# Patient Record
Sex: FEMALE | Race: AMERICAN INDIAN OR ALASKA NATIVE
[De-identification: names, ages, dates, MRNs, and addresses within clinical notes are randomized per-mention and may not be internally consistent; named-entity substitution may affect disease eponyms.]

---

## 2020-03-03 ENCOUNTER — HOSPITAL ENCOUNTER (EMERGENCY)
Dept: HOSPITAL 5 - ED | Age: 55
Discharge: HOME | End: 2020-03-03
Payer: SELF-PAY

## 2020-03-03 VITALS — DIASTOLIC BLOOD PRESSURE: 58 MMHG | SYSTOLIC BLOOD PRESSURE: 113 MMHG

## 2020-03-03 DIAGNOSIS — E11.9: ICD-10-CM

## 2020-03-03 DIAGNOSIS — J06.9: Primary | ICD-10-CM

## 2020-03-03 DIAGNOSIS — Z87.891: ICD-10-CM

## 2020-03-03 DIAGNOSIS — Z79.4: ICD-10-CM

## 2020-03-03 DIAGNOSIS — E78.5: ICD-10-CM

## 2020-03-03 DIAGNOSIS — Z98.890: ICD-10-CM

## 2020-03-03 DIAGNOSIS — J40: ICD-10-CM

## 2020-03-03 DIAGNOSIS — Z79.899: ICD-10-CM

## 2020-03-03 DIAGNOSIS — E03.9: ICD-10-CM

## 2020-03-03 DIAGNOSIS — I10: ICD-10-CM

## 2020-03-03 DIAGNOSIS — Z88.2: ICD-10-CM

## 2020-03-03 LAB
ALBUMIN SERPL-MCNC: 3.8 G/DL (ref 3.9–5)
ALT SERPL-CCNC: 12 UNITS/L (ref 7–56)
BAND NEUTROPHILS # (MANUAL): 0 K/MM3
BUN SERPL-MCNC: 16 MG/DL (ref 7–17)
BUN/CREAT SERPL: 11 %
CALCIUM SERPL-MCNC: 8.8 MG/DL (ref 8.4–10.2)
HCT VFR BLD CALC: 39.8 % (ref 30.3–42.9)
HCT VFR BLD CALC: 41.4 % (ref 30.3–42.9)
HEMOLYSIS INDEX: 5
HGB BLD-MCNC: 13.3 GM/DL (ref 10.1–14.3)
HGB BLD-MCNC: 13.6 GM/DL (ref 10.1–14.3)
MCHC RBC AUTO-ENTMCNC: 33 % (ref 30–34)
MCHC RBC AUTO-ENTMCNC: 33 % (ref 30–34)
MCV RBC AUTO: 86 FL (ref 79–97)
MCV RBC AUTO: 87 FL (ref 79–97)
MYELOCYTES # (MANUAL): 0 K/MM3
PLATELET # BLD: 212 K/MM3 (ref 140–440)
PLATELET # BLD: 223 K/MM3 (ref 140–440)
PROMYELOCYTES # (MANUAL): 0 K/MM3
RBC # BLD AUTO: 4.61 M/MM3 (ref 3.65–5.03)
RBC # BLD AUTO: 4.77 M/MM3 (ref 3.65–5.03)
TOTAL CELLS COUNTED BLD: 100
WBC TOXIC VACUOLES BLD QL SMEAR: (no result)

## 2020-03-03 PROCEDURE — 85379 FIBRIN DEGRADATION QUANT: CPT

## 2020-03-03 PROCEDURE — 82140 ASSAY OF AMMONIA: CPT

## 2020-03-03 PROCEDURE — 84484 ASSAY OF TROPONIN QUANT: CPT

## 2020-03-03 PROCEDURE — 71275 CT ANGIOGRAPHY CHEST: CPT

## 2020-03-03 PROCEDURE — 87040 BLOOD CULTURE FOR BACTERIA: CPT

## 2020-03-03 PROCEDURE — 93010 ELECTROCARDIOGRAM REPORT: CPT

## 2020-03-03 PROCEDURE — 36415 COLL VENOUS BLD VENIPUNCTURE: CPT

## 2020-03-03 PROCEDURE — 80053 COMPREHEN METABOLIC PANEL: CPT

## 2020-03-03 PROCEDURE — 94640 AIRWAY INHALATION TREATMENT: CPT

## 2020-03-03 PROCEDURE — 85025 COMPLETE CBC W/AUTO DIFF WBC: CPT

## 2020-03-03 PROCEDURE — 71046 X-RAY EXAM CHEST 2 VIEWS: CPT

## 2020-03-03 PROCEDURE — 94644 CONT INHLJ TX 1ST HOUR: CPT

## 2020-03-03 PROCEDURE — 99284 EMERGENCY DEPT VISIT MOD MDM: CPT

## 2020-03-03 PROCEDURE — 93005 ELECTROCARDIOGRAM TRACING: CPT

## 2020-03-03 PROCEDURE — 85007 BL SMEAR W/DIFF WBC COUNT: CPT

## 2020-03-03 NOTE — EVENT NOTE
Date: 03/03/20





1100 Sat 94 on room air


denies cp or sob





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





                                   Lab Results











  03/03/20 03/03/20 03/03/20 Range/Units





  10:23 10:23 10:23 


 


WBC  6.2    (4.5-11.0)  K/mm3


 


RBC  4.77    (3.65-5.03)  M/mm3


 


Hgb  13.6    (10.1-14.3)  gm/dl


 


Hct  41.4    (30.3-42.9)  %


 


MCV  87    (79-97)  fl


 


MCH  29    (28-32)  pg


 


MCHC  33    (30-34)  %


 


RDW  16.0 H    (13.2-15.2)  %


 


Plt Count  223    (140-440)  K/mm3


 


Lymph % (Auto)  Np    


 


Mono % (Auto)  Np    


 


Eos % (Auto)  Np    


 


Baso % (Auto)  Np    


 


Lymph #  Np    


 


Mono #  Np    


 


Eos #  Np    


 


Baso #  Np    


 


Seg Neutrophils %  Np    


 


Seg Neutrophils #  Np    


 


D-Dimer     (0-234)  ng/mlDDU


 


Sodium   137   (137-145)  mmol/L


 


Potassium   3.7   (3.6-5.0)  mmol/L


 


Chloride   99.1   ()  mmol/L


 


Carbon Dioxide   20 L   (22-30)  mmol/L


 


Anion Gap   22   mmol/L


 


BUN   16   (7-17)  mg/dL


 


Creatinine   1.4 H   (0.7-1.2)  mg/dL


 


Estimated GFR   47   ml/min


 


BUN/Creatinine Ratio   11   %


 


Glucose   304 H   ()  mg/dL


 


Lactic Acid     (0.7-2.0)  mmol/L


 


Calcium   8.8   (8.4-10.2)  mg/dL


 


Total Bilirubin   0.20   (0.1-1.2)  mg/dL


 


AST   11   (5-40)  units/L


 


ALT   12   (7-56)  units/L


 


Alkaline Phosphatase   55   ()  units/L


 


Troponin T    < 0.010  (0.00-0.029)  ng/mL


 


Total Protein   6.1 L   (6.3-8.2)  g/dL


 


Albumin   3.8 L   (3.9-5)  g/dL


 


Albumin/Globulin Ratio   1.7   %














  03/03/20 03/03/20 03/03/20 Range/Units





  10:23 10:23 11:18 


 


WBC    6.4  (4.5-11.0)  K/mm3


 


RBC    4.61  (3.65-5.03)  M/mm3


 


Hgb    13.3  (10.1-14.3)  gm/dl


 


Hct    39.8  (30.3-42.9)  %


 


MCV    86  (79-97)  fl


 


MCH    29  (28-32)  pg


 


MCHC    33  (30-34)  %


 


RDW    15.8 H  (13.2-15.2)  %


 


Plt Count    212  (140-440)  K/mm3


 


Lymph % (Auto)     


 


Mono % (Auto)    Np  


 


Eos % (Auto)     


 


Baso % (Auto)     


 


Lymph #     


 


Mono #     


 


Eos #     


 


Baso #     


 


Seg Neutrophils %     


 


Seg Neutrophils #     


 


D-Dimer  319.95 H    (0-234)  ng/mlDDU


 


Sodium     (137-145)  mmol/L


 


Potassium     (3.6-5.0)  mmol/L


 


Chloride     ()  mmol/L


 


Carbon Dioxide     (22-30)  mmol/L


 


Anion Gap     mmol/L


 


BUN     (7-17)  mg/dL


 


Creatinine     (0.7-1.2)  mg/dL


 


Estimated GFR     ml/min


 


BUN/Creatinine Ratio     %


 


Glucose     ()  mg/dL


 


Lactic Acid   1.70   (0.7-2.0)  mmol/L


 


Calcium     (8.4-10.2)  mg/dL


 


Total Bilirubin     (0.1-1.2)  mg/dL


 


AST     (5-40)  units/L


 


ALT     (7-56)  units/L


 


Alkaline Phosphatase     ()  units/L


 


Troponin T     (0.00-0.029)  ng/mL


 


Total Protein     (6.3-8.2)  g/dL


 


Albumin     (3.9-5)  g/dL


 


Albumin/Globulin Ratio     %








given ddimer elevated and recent brain mass- CTA chest ordered 





1230


CT pending





1320


CTA negative





sat 99 on room air and HR 90. 


reports feeling better





dc home with dc plan of care and pcp follow up in AM.

## 2020-03-03 NOTE — XRAY REPORT
CHEST 2 VIEWS 



INDICATION:  Shortness of breath, cough with nausea and vomiting for 2 days. Body aches and chills..



COMPARISON:  None



FINDINGS:

Support devices: None.



Heart: Within normal limits. 

Lungs/pleura: No acute air space or interstitial disease.  No pneumothorax.



Additional findings: None.



IMPRESSION:

Unremarkable chest films.



Signer Name: Davian Connelly Jr, MD 

Signed: 3/3/2020 8:15 AM

Workstation Name: [id number]

## 2020-03-03 NOTE — EMERGENCY DEPARTMENT REPORT
Minor Respiratory





- HPI


Chief Complaint: Upper Respiratory Infection


Stated Complaint: COUGH  DIZZY W/ WEAKNESS


Time Seen by Provider: 03/03/20 07:35


Duration: 5 Days (Patient is a 48)


Pain Location: Chest


Severity: mild


Minor Respiratory: Yes Rhinorrhea, Yes Able to Tolerate Fluids, Yes Cough, Yes 

Fever, No Sore Throat, No Ear Pain, No Sick Contacts, No Hemoptysis, No Chest 

Pain, No Shortness of Breath


Other History: Patient is a 54-year-old -American female who comes to the

ER today with a cough that started on Monday.  She endorses some vomiting with 

the cough.  She also reports fever up to 101 at home.  She did get a influenza 

vaccine this year.  In the triage area her temp was 100.3.  Heart rate 110.  

Patient does have a history of diabetes, hypothyroidism and hypertension.  She 

is a prior smoker.  She takes her insulin and Synthroid daily.  She has rhonchi 

on initial exam and given her past medical history and concerns for pneumonia.





ED Review of Systems


ROS: 


Stated complaint: COUGH  DIZZY W/ WEAKNESS


Other details as noted in HPI





Comment: All other systems reviewed and negative





ED Past Medical Hx





- Past Medical History


Previous Medical History?: Yes


Hx Hypertension: Yes


Hx CVA: No


Hx Heart Attack/AMI: No


Hx Congestive Heart Failure: No


Hx Diabetes: Yes


Hx Deep Vein Thrombosis: No


Hx Pulmonary Embolism: No


Hx GERD: No


Additional medical history: Hyperlipedemia.  hyperthyroid





- Surgical History


Past Surgical History?: Yes


Additional Surgical History: Right ankle surgery.  right thyroid removed





- Family History


Family history: no significant





- Social History


Smoking Status: Never Smoker


Substance Use Type: None





- Medications


Home Medications: 


                                Home Medications











 Medication  Instructions  Recorded  Confirmed  Last Taken  Type


 


Azithromycin [Zithromax Z-MARCY] 250 mg PO DAILY #6 tablet 03/03/20  Unknown Rx


 


Benzonatate [Tessalon Perles] 100 mg PO Q12H PRN #20 capsule 03/03/20  Unknown 

Rx


 


Fluticasone [Flonase] 1 spray NS QDAY #1 bottle 03/03/20  Unknown Rx


 


predniSONE [Deltasone] 20 mg PO DAILY #5 tablet 03/03/20  Unknown Rx














Minor Respiratory Exam





- Exam


General: 


Vital signs noted. No distress. Alert and acting appropriately.





HEENT: Yes Moist Mucous Membranes, No Pharyngeal Erythema, No Pharyngeal 

Exudates, No Rhinorrhea, No Conjuctival Injection, No Frontal Tenderness, No 

Maxillary Tenderness


Ear: Neither TM Bulge, Neither TM Erythema, Neither EAC Pain, Neither EAC 

Discharge


Neck: Yes Supple, No Adenopathy


Lungs: Yes Good Air Exchange, Yes Wheezes, Yes Ronchi, No Stridor, No Cough, No 

Labored Respirations, No Retractions, No Use of Accessory Muscles, No Other 

Abnormal Lung Sounds


Heart: Yes Regular, No Murmur


Abdomen: Yes Normal Bowel Sounds, No Tenderness, No Peritoneal Signs


Skin: No Rash, No Edema


Neurologic: 


Alert and oriented, no deficits.








Musculoskeletal: 


Unremarkable.











ED Course


                                   Vital Signs











  03/03/20





  04:40


 


Temperature 100.3 F H


 


Pulse Rate 109 H


 


Respiratory 18





Rate 


 


Blood Pressure 170/79


 


O2 Sat by Pulse 96





Oximetry 














ED Medical Decision Making





- Radiology Data


Radiology results: report reviewed, image reviewed





- Medical Decision Making





XRAY  NOTED





DUONEB IN ER- WITH IMPROVEMENT





DC HOME WITH RX AND PCP FOLLOW UP





                             Vital Signs (72 hours)











  03/03/20





  04:40


 


Temperature 100.3 F H


 


Pulse Rate 109 H


 


Respiratory 18





Rate 


 


Blood Pressure 170/79


 


O2 Sat by Pulse 96





Oximetry 














- Differential Diagnosis


RO PNA


Critical care attestation.: 


If time is entered above; I have spent that time in minutes in the direct care 

of this critically ill patient, excluding procedure time.








ED Disposition


Clinical Impression: 


 Bronchitis, URI (upper respiratory infection)





Disposition: DC-01 TO HOME OR SELFCARE


Is pt being admited?: No


Does the pt Need Aspirin: No


Condition: Stable


Instructions:  Acute Bronchitis (ED)


Additional Instructions: 


STAY WELL HYDRATED





MOTRIN OR TYLENOL FOR FEVER





MEDS AS ORDERED TODAY





FOLLOW YOUR BLOOD SUGARS THEY MAY INC WITH INFECTION





FOLLOW UP WITH PCP ON FRIDAY TO BE SURE YOU ARE GETTING BETTER








Prescriptions: 


predniSONE [Deltasone] 20 mg PO DAILY #5 tablet


Fluticasone [Flonase] 1 spray NS QDAY #1 bottle


Benzonatate [Tessalon Perles] 100 mg PO Q12H PRN #20 capsule


 PRN Reason: Cough


Azithromycin [Zithromax Z-MARCY] 250 mg PO DAILY #6 tablet


Referrals: 


TEN MONTESINOS MD [Staff Physician] - 3-5 Days


Forms:  Work/School Release Form(ED)


Time of Disposition: 08:13

## 2020-03-03 NOTE — CAT SCAN REPORT
CTA chest with contrast



INDICATION : hypoxia.



TECHNIQUE:  Axial imaging performed through the chest, with contrast bolus timing set to maximize opa
cification of the pulmonary arteries. 3-plane MIP reformatted images were obtained.  All CT scans at 
this location are performed using CT dose reduction for ALARA by means of automated exposure control.
 



100 mL of intravenous contrast administered. Consent was obtained prior to the administration of cont
rast.



COMPARISON:  No relevant comparative imaging.



FINDINGS:  



Bolus:  Contrast bolus timing is adequate.

PTE:  No filling defect is present to suggest PTE.

Mediastinum:  Heart and great vessels appear normal.  No pathologic mediastinal adenopathy.

Lungs:  Lungs are clear except for mild right lower lobe subsegmental atelectasis..



Upper abdomen:  Limited imaging of the upper abdomen shows nothing acute.  There is a small hiatal he
rnia.

Bones:  Degenerative changes in the spine with nothing acute.



IMPRESSION:

1. Negative for PTE.

2. No CHF or pneumonia.

3. Hiatal hernia.



Signer Name: Alen Solis MD 

Signed: 3/3/2020 1:02 PM

Workstation Name: [id number]